# Patient Record
Sex: MALE | Race: WHITE | Employment: FULL TIME | URBAN - METROPOLITAN AREA
[De-identification: names, ages, dates, MRNs, and addresses within clinical notes are randomized per-mention and may not be internally consistent; named-entity substitution may affect disease eponyms.]

---

## 2017-10-30 PROBLEM — N52.9 ERECTILE DYSFUNCTION, UNSPECIFIED ERECTILE DYSFUNCTION TYPE: Status: ACTIVE | Noted: 2017-10-30

## 2017-10-30 PROBLEM — E29.1 HYPOGONADISM IN MALE: Status: ACTIVE | Noted: 2017-10-30

## 2017-11-30 ENCOUNTER — OFFICE VISIT (OUTPATIENT)
Dept: OTHER | Facility: HOSPITAL | Age: 57
End: 2017-11-30
Attending: PREVENTIVE MEDICINE

## 2017-11-30 DIAGNOSIS — Z00.00 ANNUAL PHYSICAL EXAM: Primary | ICD-10-CM

## 2017-12-18 PROCEDURE — 36415 COLL VENOUS BLD VENIPUNCTURE: CPT | Performed by: UROLOGY

## 2017-12-18 PROCEDURE — 84154 ASSAY OF PSA FREE: CPT | Performed by: UROLOGY

## 2017-12-18 PROCEDURE — 84153 ASSAY OF PSA TOTAL: CPT | Performed by: UROLOGY

## 2018-01-10 ENCOUNTER — SURGERY (OUTPATIENT)
Age: 58
End: 2018-01-10

## 2018-01-10 ENCOUNTER — HOSPITAL ENCOUNTER (OUTPATIENT)
Facility: HOSPITAL | Age: 58
Setting detail: HOSPITAL OUTPATIENT SURGERY
Discharge: HOME | End: 2018-01-10
Attending: INTERNAL MEDICINE | Admitting: INTERNAL MEDICINE
Payer: COMMERCIAL

## 2018-01-10 VITALS
TEMPERATURE: 98 F | BODY MASS INDEX: 29.82 KG/M2 | WEIGHT: 190 LBS | DIASTOLIC BLOOD PRESSURE: 84 MMHG | HEIGHT: 67 IN | OXYGEN SATURATION: 99 % | HEART RATE: 72 BPM | RESPIRATION RATE: 16 BRPM | SYSTOLIC BLOOD PRESSURE: 127 MMHG

## 2018-01-10 DIAGNOSIS — K21.9 GASTROESOPHAGEAL REFLUX DISEASE, ESOPHAGITIS PRESENCE NOT SPECIFIED: ICD-10-CM

## 2018-01-10 DIAGNOSIS — Z12.11 SPECIAL SCREENING FOR MALIGNANT NEOPLASMS, COLON: ICD-10-CM

## 2018-01-10 DIAGNOSIS — R10.13 ABDOMINAL PAIN, EPIGASTRIC: ICD-10-CM

## 2018-01-10 PROCEDURE — 0DBH8ZX EXCISION OF CECUM, VIA NATURAL OR ARTIFICIAL OPENING ENDOSCOPIC, DIAGNOSTIC: ICD-10-PCS | Performed by: INTERNAL MEDICINE

## 2018-01-10 PROCEDURE — 99153 MOD SED SAME PHYS/QHP EA: CPT | Performed by: INTERNAL MEDICINE

## 2018-01-10 PROCEDURE — 88305 TISSUE EXAM BY PATHOLOGIST: CPT | Performed by: INTERNAL MEDICINE

## 2018-01-10 PROCEDURE — 0DB68ZX EXCISION OF STOMACH, VIA NATURAL OR ARTIFICIAL OPENING ENDOSCOPIC, DIAGNOSTIC: ICD-10-PCS | Performed by: INTERNAL MEDICINE

## 2018-01-10 PROCEDURE — 99152 MOD SED SAME PHYS/QHP 5/>YRS: CPT | Performed by: INTERNAL MEDICINE

## 2018-01-10 RX ORDER — SODIUM CHLORIDE, SODIUM LACTATE, POTASSIUM CHLORIDE, CALCIUM CHLORIDE 600; 310; 30; 20 MG/100ML; MG/100ML; MG/100ML; MG/100ML
INJECTION, SOLUTION INTRAVENOUS CONTINUOUS
Status: DISCONTINUED | OUTPATIENT
Start: 2018-01-10 | End: 2018-01-10

## 2018-01-10 RX ORDER — ONDANSETRON 2 MG/ML
4 INJECTION INTRAMUSCULAR; INTRAVENOUS ONCE AS NEEDED
Status: DISCONTINUED | OUTPATIENT
Start: 2018-01-10 | End: 2018-01-10

## 2018-01-10 RX ORDER — MIDAZOLAM HYDROCHLORIDE 1 MG/ML
INJECTION INTRAMUSCULAR; INTRAVENOUS
Status: DISCONTINUED | OUTPATIENT
Start: 2018-01-10 | End: 2018-01-10

## 2018-01-10 NOTE — H&P
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   GERD, epigastric abd pain, CRC screening     See note reviewed from 1/5/18    PROCEDURE: EGD colonoscopy    Allergies: Patient has n

## 2018-01-10 NOTE — OPERATIVE REPORT
[address] Patient Status:  Hospital Outpatient Surgery    8/15/1960 MRN [id number]   [facility] ENDOSCOPY Attending Jean-Claude Paz MD   Hosp Day # 0 PCP Estefany Hernandez MD         PATIENT NAME: Tip Blank  DATE OF OP retrieved. There were a few diverticula noted in the sigmoid colon. The remainder of the entire examined colon was otherwise normal.  After retroflexion in the rectum, the colonoscope was straightened and removed and the procedure was completed.  The stacey

## 2018-01-11 NOTE — PROGRESS NOTES
Here are the biopsy/pathology findings from your recent EGD (upper endoscopy) and colonoscopy:     The biopsy/pathology findings from your upper endoscopy showed:    No infection in stomach    The  biopsy/pathology findings from your colonoscopy showed:

## 2018-01-24 NOTE — PAYOR COMM NOTE
--------------  ADMISSION REVIEW     Payor: [employer]  Subscriber #:  [id number]  Authorization Number: [id number]    Admit date: N/A  Admit time: [address] [facility] OP surgery - Not admitted       Admitting Physician: Sagrario Carl MD  Attendin mucosa was further carefully inspected. There was a 8 mm polyp in the cecum that was completely removed with a cold forceps and retrieved. There were a few diverticula noted in the sigmoid colon.   The remainder of the entire examined colon was otherwise

## 2018-03-07 PROCEDURE — 87177 OVA AND PARASITES SMEARS: CPT | Performed by: INTERNAL MEDICINE

## 2018-03-07 PROCEDURE — 87046 STOOL CULTR AEROBIC BACT EA: CPT | Performed by: INTERNAL MEDICINE

## 2018-03-07 PROCEDURE — 89055 LEUKOCYTE ASSESSMENT FECAL: CPT | Performed by: INTERNAL MEDICINE

## 2018-03-07 PROCEDURE — 87209 SMEAR COMPLEX STAIN: CPT | Performed by: INTERNAL MEDICINE

## 2018-03-07 PROCEDURE — 87329 GIARDIA AG IA: CPT | Performed by: INTERNAL MEDICINE

## 2018-03-07 PROCEDURE — 87045 FECES CULTURE AEROBIC BACT: CPT | Performed by: INTERNAL MEDICINE

## 2018-03-07 PROCEDURE — 87272 CRYPTOSPORIDIUM AG IF: CPT | Performed by: INTERNAL MEDICINE

## 2019-03-07 PROBLEM — E66.09 CLASS 1 OBESITY DUE TO EXCESS CALORIES WITHOUT SERIOUS COMORBIDITY WITH BODY MASS INDEX (BMI) OF 30.0 TO 30.9 IN ADULT: Status: ACTIVE | Noted: 2019-03-07

## 2020-05-23 PROBLEM — N52.9 ERECTILE DYSFUNCTION, UNSPECIFIED ERECTILE DYSFUNCTION TYPE: Status: RESOLVED | Noted: 2017-10-30 | Resolved: 2020-05-23

## 2023-05-07 ENCOUNTER — LAB ENCOUNTER (OUTPATIENT)
Dept: LAB | Facility: HOSPITAL | Age: 63
End: 2023-05-07
Attending: UROLOGY
Payer: COMMERCIAL

## 2023-05-07 DIAGNOSIS — Z01.818 PREOP TESTING: ICD-10-CM

## 2023-05-07 LAB
ANTIBODY SCREEN: NEGATIVE
RH BLOOD TYPE: POSITIVE
RH BLOOD TYPE: POSITIVE

## 2023-05-07 PROCEDURE — 86901 BLOOD TYPING SEROLOGIC RH(D): CPT

## 2023-05-07 PROCEDURE — 36415 COLL VENOUS BLD VENIPUNCTURE: CPT

## 2023-05-07 PROCEDURE — 86850 RBC ANTIBODY SCREEN: CPT

## 2023-05-07 PROCEDURE — 86900 BLOOD TYPING SEROLOGIC ABO: CPT

## 2023-05-11 ENCOUNTER — ANESTHESIA EVENT (OUTPATIENT)
Dept: SURGERY | Facility: HOSPITAL | Age: 63
End: 2023-05-11
Payer: COMMERCIAL

## 2023-05-11 ENCOUNTER — ANESTHESIA (OUTPATIENT)
Dept: SURGERY | Facility: HOSPITAL | Age: 63
End: 2023-05-11
Payer: COMMERCIAL

## 2023-05-11 ENCOUNTER — HOSPITAL ENCOUNTER (OUTPATIENT)
Facility: HOSPITAL | Age: 63
Discharge: HOME | End: 2023-05-12
Attending: UROLOGY | Admitting: UROLOGY
Payer: COMMERCIAL

## 2023-05-11 DIAGNOSIS — Z01.818 PREOP TESTING: Primary | ICD-10-CM

## 2023-05-11 PROBLEM — C61 PROSTATE CANCER (HCC): Status: ACTIVE | Noted: 2023-05-11

## 2023-05-11 LAB
ANION GAP SERPL CALC-SCNC: 5 MMOL/L (ref 0–18)
BUN BLD-MCNC: 21 MG/DL (ref 7–18)
BUN/CREAT SERPL: 20.6 (ref 10–20)
CALCIUM BLD-MCNC: 8.2 MG/DL (ref 8.5–10.1)
CHLORIDE SERPL-SCNC: 106 MMOL/L (ref 98–112)
CO2 SERPL-SCNC: 27 MMOL/L (ref 21–32)
CREAT BLD-MCNC: 1.02 MG/DL
DEPRECATED RDW RBC AUTO: 39.5 FL (ref 35.1–46.3)
ERYTHROCYTE [DISTWIDTH] IN BLOOD BY AUTOMATED COUNT: 12.3 % (ref 11–15)
GFR SERPLBLD BASED ON 1.73 SQ M-ARVRAT: 82 ML/MIN/1.73M2 (ref 60–?)
GLUCOSE BLD-MCNC: 160 MG/DL (ref 70–99)
HCT VFR BLD AUTO: 44 %
HGB BLD-MCNC: 14.3 G/DL
MCH RBC QN AUTO: 28.3 PG (ref 26–34)
MCHC RBC AUTO-ENTMCNC: 32.5 G/DL (ref 31–37)
MCV RBC AUTO: 87 FL
OSMOLALITY SERPL CALC.SUM OF ELEC: 292 MOSM/KG (ref 275–295)
PLATELET # BLD AUTO: 188 10(3)UL (ref 150–450)
POTASSIUM SERPL-SCNC: 3.6 MMOL/L (ref 3.5–5.1)
RBC # BLD AUTO: 5.06 X10(6)UL
SODIUM SERPL-SCNC: 138 MMOL/L (ref 136–145)
WBC # BLD AUTO: 14.5 X10(3) UL (ref 4–11)

## 2023-05-11 PROCEDURE — 8E0W4CZ ROBOTIC ASSISTED PROCEDURE OF TRUNK REGION, PERCUTANEOUS ENDOSCOPIC APPROACH: ICD-10-PCS | Performed by: UROLOGY

## 2023-05-11 PROCEDURE — 88307 TISSUE EXAM BY PATHOLOGIST: CPT | Performed by: UROLOGY

## 2023-05-11 PROCEDURE — 85027 COMPLETE CBC AUTOMATED: CPT | Performed by: UROLOGY

## 2023-05-11 PROCEDURE — 80048 BASIC METABOLIC PNL TOTAL CA: CPT | Performed by: UROLOGY

## 2023-05-11 PROCEDURE — 0VT04ZZ RESECTION OF PROSTATE, PERCUTANEOUS ENDOSCOPIC APPROACH: ICD-10-PCS | Performed by: UROLOGY

## 2023-05-11 PROCEDURE — 07BC4ZX EXCISION OF PELVIS LYMPHATIC, PERCUTANEOUS ENDOSCOPIC APPROACH, DIAGNOSTIC: ICD-10-PCS | Performed by: UROLOGY

## 2023-05-11 PROCEDURE — 88309 TISSUE EXAM BY PATHOLOGIST: CPT | Performed by: UROLOGY

## 2023-05-11 RX ORDER — MAGNESIUM HYDROXIDE 1200 MG/15ML
LIQUID ORAL CONTINUOUS PRN
Status: COMPLETED | OUTPATIENT
Start: 2023-05-11 | End: 2023-05-11

## 2023-05-11 RX ORDER — FAMOTIDINE 20 MG/1
20 TABLET, FILM COATED ORAL ONCE
Status: COMPLETED | OUTPATIENT
Start: 2023-05-11 | End: 2023-05-11

## 2023-05-11 RX ORDER — MORPHINE SULFATE 10 MG/ML
6 INJECTION, SOLUTION INTRAMUSCULAR; INTRAVENOUS EVERY 10 MIN PRN
Status: DISCONTINUED | OUTPATIENT
Start: 2023-05-11 | End: 2023-05-11 | Stop reason: HOSPADM

## 2023-05-11 RX ORDER — CEFAZOLIN SODIUM/WATER 2 G/20 ML
2 SYRINGE (ML) INTRAVENOUS EVERY 8 HOURS
Status: COMPLETED | OUTPATIENT
Start: 2023-05-11 | End: 2023-05-12

## 2023-05-11 RX ORDER — ONDANSETRON 2 MG/ML
4 INJECTION INTRAMUSCULAR; INTRAVENOUS EVERY 6 HOURS PRN
Status: DISCONTINUED | OUTPATIENT
Start: 2023-05-11 | End: 2023-05-12

## 2023-05-11 RX ORDER — MORPHINE SULFATE 4 MG/ML
4 INJECTION, SOLUTION INTRAMUSCULAR; INTRAVENOUS EVERY 10 MIN PRN
Status: DISCONTINUED | OUTPATIENT
Start: 2023-05-11 | End: 2023-05-11 | Stop reason: HOSPADM

## 2023-05-11 RX ORDER — ROCURONIUM BROMIDE 10 MG/ML
INJECTION, SOLUTION INTRAVENOUS AS NEEDED
Status: DISCONTINUED | OUTPATIENT
Start: 2023-05-11 | End: 2023-05-11 | Stop reason: SURG

## 2023-05-11 RX ORDER — BUPIVACAINE HYDROCHLORIDE 2.5 MG/ML
INJECTION, SOLUTION EPIDURAL; INFILTRATION; INTRACAUDAL AS NEEDED
Status: DISCONTINUED | OUTPATIENT
Start: 2023-05-11 | End: 2023-05-11 | Stop reason: HOSPADM

## 2023-05-11 RX ORDER — HYDROMORPHONE HYDROCHLORIDE 1 MG/ML
0.2 INJECTION, SOLUTION INTRAMUSCULAR; INTRAVENOUS; SUBCUTANEOUS EVERY 5 MIN PRN
Status: DISCONTINUED | OUTPATIENT
Start: 2023-05-11 | End: 2023-05-11 | Stop reason: HOSPADM

## 2023-05-11 RX ORDER — HYDROMORPHONE HYDROCHLORIDE 1 MG/ML
0.4 INJECTION, SOLUTION INTRAMUSCULAR; INTRAVENOUS; SUBCUTANEOUS EVERY 5 MIN PRN
Status: DISCONTINUED | OUTPATIENT
Start: 2023-05-11 | End: 2023-05-11 | Stop reason: HOSPADM

## 2023-05-11 RX ORDER — PROCHLORPERAZINE EDISYLATE 5 MG/ML
5 INJECTION INTRAMUSCULAR; INTRAVENOUS EVERY 8 HOURS PRN
Status: DISCONTINUED | OUTPATIENT
Start: 2023-05-11 | End: 2023-05-12

## 2023-05-11 RX ORDER — HYDROMORPHONE HYDROCHLORIDE 1 MG/ML
0.6 INJECTION, SOLUTION INTRAMUSCULAR; INTRAVENOUS; SUBCUTANEOUS EVERY 5 MIN PRN
Status: DISCONTINUED | OUTPATIENT
Start: 2023-05-11 | End: 2023-05-11 | Stop reason: HOSPADM

## 2023-05-11 RX ORDER — MEPERIDINE HYDROCHLORIDE 25 MG/ML
25 INJECTION INTRAMUSCULAR; INTRAVENOUS; SUBCUTANEOUS ONCE
Status: COMPLETED | OUTPATIENT
Start: 2023-05-11 | End: 2023-05-11

## 2023-05-11 RX ORDER — ACETAMINOPHEN 500 MG
1000 TABLET ORAL ONCE
Status: COMPLETED | OUTPATIENT
Start: 2023-05-11 | End: 2023-05-11

## 2023-05-11 RX ORDER — DEXAMETHASONE SODIUM PHOSPHATE 4 MG/ML
VIAL (ML) INJECTION AS NEEDED
Status: DISCONTINUED | OUTPATIENT
Start: 2023-05-11 | End: 2023-05-11 | Stop reason: SURG

## 2023-05-11 RX ORDER — PROCHLORPERAZINE EDISYLATE 5 MG/ML
5 INJECTION INTRAMUSCULAR; INTRAVENOUS EVERY 8 HOURS PRN
Status: DISCONTINUED | OUTPATIENT
Start: 2023-05-11 | End: 2023-05-11 | Stop reason: HOSPADM

## 2023-05-11 RX ORDER — DOCUSATE SODIUM 100 MG/1
100 CAPSULE, LIQUID FILLED ORAL 2 TIMES DAILY
Status: DISCONTINUED | OUTPATIENT
Start: 2023-05-11 | End: 2023-05-12

## 2023-05-11 RX ORDER — ONDANSETRON 2 MG/ML
4 INJECTION INTRAMUSCULAR; INTRAVENOUS EVERY 6 HOURS PRN
Status: DISCONTINUED | OUTPATIENT
Start: 2023-05-11 | End: 2023-05-11 | Stop reason: HOSPADM

## 2023-05-11 RX ORDER — ACETAMINOPHEN 500 MG
1000 TABLET ORAL EVERY 8 HOURS SCHEDULED
Status: DISCONTINUED | OUTPATIENT
Start: 2023-05-11 | End: 2023-05-12

## 2023-05-11 RX ORDER — METOCLOPRAMIDE 10 MG/1
10 TABLET ORAL ONCE
Status: COMPLETED | OUTPATIENT
Start: 2023-05-11 | End: 2023-05-11

## 2023-05-11 RX ORDER — TRAMADOL HYDROCHLORIDE 50 MG/1
50 TABLET ORAL EVERY 6 HOURS PRN
Status: DISCONTINUED | OUTPATIENT
Start: 2023-05-11 | End: 2023-05-12

## 2023-05-11 RX ORDER — CEFAZOLIN SODIUM/WATER 2 G/20 ML
2 SYRINGE (ML) INTRAVENOUS ONCE
Status: COMPLETED | OUTPATIENT
Start: 2023-05-11 | End: 2023-05-11

## 2023-05-11 RX ORDER — SODIUM CHLORIDE, SODIUM LACTATE, POTASSIUM CHLORIDE, CALCIUM CHLORIDE 600; 310; 30; 20 MG/100ML; MG/100ML; MG/100ML; MG/100ML
INJECTION, SOLUTION INTRAVENOUS CONTINUOUS
Status: DISCONTINUED | OUTPATIENT
Start: 2023-05-11 | End: 2023-05-11

## 2023-05-11 RX ORDER — MORPHINE SULFATE 4 MG/ML
2 INJECTION, SOLUTION INTRAMUSCULAR; INTRAVENOUS EVERY 10 MIN PRN
Status: DISCONTINUED | OUTPATIENT
Start: 2023-05-11 | End: 2023-05-11 | Stop reason: HOSPADM

## 2023-05-11 RX ORDER — EPHEDRINE SULFATE 50 MG/ML
INJECTION, SOLUTION INTRAVENOUS AS NEEDED
Status: DISCONTINUED | OUTPATIENT
Start: 2023-05-11 | End: 2023-05-11 | Stop reason: SURG

## 2023-05-11 RX ORDER — SODIUM CHLORIDE, SODIUM LACTATE, POTASSIUM CHLORIDE, CALCIUM CHLORIDE 600; 310; 30; 20 MG/100ML; MG/100ML; MG/100ML; MG/100ML
INJECTION, SOLUTION INTRAVENOUS CONTINUOUS
Status: DISCONTINUED | OUTPATIENT
Start: 2023-05-11 | End: 2023-05-11 | Stop reason: HOSPADM

## 2023-05-11 RX ORDER — SODIUM CHLORIDE 9 MG/ML
INJECTION, SOLUTION INTRAVENOUS CONTINUOUS
Status: DISCONTINUED | OUTPATIENT
Start: 2023-05-11 | End: 2023-05-12

## 2023-05-11 RX ORDER — HEPARIN SODIUM 5000 [USP'U]/ML
5000 INJECTION, SOLUTION INTRAVENOUS; SUBCUTANEOUS ONCE
Status: COMPLETED | OUTPATIENT
Start: 2023-05-11 | End: 2023-05-11

## 2023-05-11 RX ORDER — HEPARIN SODIUM 5000 [USP'U]/ML
5000 INJECTION, SOLUTION INTRAVENOUS; SUBCUTANEOUS EVERY 8 HOURS SCHEDULED
Status: DISCONTINUED | OUTPATIENT
Start: 2023-05-11 | End: 2023-05-12

## 2023-05-11 RX ORDER — POLYETHYLENE GLYCOL 3350 17 G/17G
17 POWDER, FOR SOLUTION ORAL DAILY PRN
Status: DISCONTINUED | OUTPATIENT
Start: 2023-05-11 | End: 2023-05-12

## 2023-05-11 RX ORDER — NALOXONE HYDROCHLORIDE 0.4 MG/ML
80 INJECTION, SOLUTION INTRAMUSCULAR; INTRAVENOUS; SUBCUTANEOUS AS NEEDED
Status: DISCONTINUED | OUTPATIENT
Start: 2023-05-11 | End: 2023-05-11 | Stop reason: HOSPADM

## 2023-05-11 RX ORDER — SENNOSIDES 8.6 MG
17.2 TABLET ORAL NIGHTLY
Status: DISCONTINUED | OUTPATIENT
Start: 2023-05-11 | End: 2023-05-12

## 2023-05-11 RX ORDER — ONDANSETRON 2 MG/ML
INJECTION INTRAMUSCULAR; INTRAVENOUS AS NEEDED
Status: DISCONTINUED | OUTPATIENT
Start: 2023-05-11 | End: 2023-05-11 | Stop reason: SURG

## 2023-05-11 RX ADMIN — SODIUM CHLORIDE, SODIUM LACTATE, POTASSIUM CHLORIDE, CALCIUM CHLORIDE: 600; 310; 30; 20 INJECTION, SOLUTION INTRAVENOUS at 12:42:00

## 2023-05-11 RX ADMIN — ONDANSETRON 4 MG: 2 INJECTION INTRAMUSCULAR; INTRAVENOUS at 12:47:00

## 2023-05-11 RX ADMIN — EPHEDRINE SULFATE 10 MG: 50 INJECTION, SOLUTION INTRAVENOUS at 08:47:00

## 2023-05-11 RX ADMIN — SODIUM CHLORIDE, SODIUM LACTATE, POTASSIUM CHLORIDE, CALCIUM CHLORIDE: 600; 310; 30; 20 INJECTION, SOLUTION INTRAVENOUS at 12:46:00

## 2023-05-11 RX ADMIN — ROCURONIUM BROMIDE 50 MG: 10 INJECTION, SOLUTION INTRAVENOUS at 08:48:00

## 2023-05-11 RX ADMIN — ROCURONIUM BROMIDE 30 MG: 10 INJECTION, SOLUTION INTRAVENOUS at 11:17:00

## 2023-05-11 RX ADMIN — CEFAZOLIN SODIUM/WATER 2 G: 2 G/20 ML SYRINGE (ML) INTRAVENOUS at 08:42:00

## 2023-05-11 RX ADMIN — DEXAMETHASONE SODIUM PHOSPHATE 4 MG: 4 MG/ML VIAL (ML) INJECTION at 12:47:00

## 2023-05-11 RX ADMIN — ROCURONIUM BROMIDE 30 MG: 10 INJECTION, SOLUTION INTRAVENOUS at 09:59:00

## 2023-05-11 NOTE — OPERATIVE REPORT
Operative Report  [facility]    Patient name: Prabhjot Ramirez Room 8/15/1960  MRN [id number]    Date of operation 05/11/23    Preoperative Diagnosis:  1. Prostate cancer    Postoperative Diagnosis:   Same    Procedure:   1. Robotic-assisted laparoscopic radical prostatectomy  2. Bilateral pelvic lymphadenectomy    Surgeon:  Brandon Marr MD    Assistant:  Bernarda Falcon CSA    Findings:   A standard template dissection was performed with removal of the obturator, hypogastric and external iliac lymph nodes. A (bilateral nerve sparing) procedure was performed. A running urethrovesical anastomosis was performed which was noted to be watertight at the end of the procedure. There were no intraoperative complications. Anesthesia: general    Complications: none    EBL: 100cc    Drains:  18Fr lopez catheter    Condition: transferred to PACU in stable condition    Specimen:   Bilateral pelvic lymph nodes  Prostate and bilateral seminal vesicles, periprostatic fat    Operative indication:  62M with unfavorable intermediate risk prostate cancer. His PSA was 9.06 ng/ml (1/27/23). MRI prostate 2/4/23: volume 21.1g; PIRADS IV lesion in right mid PZ, 7mm. He underwent TRUS-guided PNBx 2/14/23: GG2 (Gl3+4) in six cores (R mid, R apex; 38mm), GG1 (Gl3+3) in two cores (R base, 9mm). He presents today for robotic-assisted laparoscopic radical prostatectomy, bilateral pelvic lymph node dissection. OPERATIVE PROCEDURE:    After informed consent was obtained, the patient was taken to the operating room. A time-out was performed where the patient and procedure were identified in the presence of Nursing, Anesthesia, and surgical staff. After the placement of lines and monitors, general anesthesia was induced. Prophylactic antibiotics were administered. Sequential compressive devices were placed on both lower extremities. The patient was placed in the lithotomy position.   He was prepped and draped in a standard sterile fashion. Through a supra-umbilical incision, a Veress needle was inserted into the peritoneal cavity and a pneumoperitoneum was established. Due to high insufflation pressure, a stab incision was made at Jung's point. The veress needle was then introduced and pneumoperitoneum was again established. The incisions were marked out. Through the left lower quadrant incision, a 8-mm trocar was placed and the laparoscope was introduced. Inspection of the abdominal cavity after trocar placement revealed no evidence of any injury. Three 8-mm ports and a 12-mm port were then placed in the abdomen in the standard configuration under direct vision. The patient was then placed in steep Trendelenburg position and the robot was docked. He was noted to have adhesions in the right lower quadrant at the area of his prior appendectomy. These were taken down. Left sided colonic adhesions were taken down. We then retracted the colon into the abdomen and made a posterior peritoneal incision, dissected the seminal vesicles and vas deferens. The anterior space was made. We then performed bilateral pelvic lymph node dissection. The borders of our dissection were the external iliac vein down to the deep obturator fossa. Weck clips were placed proximally and distally. The obturator nerve, artery and vein were identified and preserved. We then mobilized the bladder off the anterior abdominal wall, entered the space of Retzius and  mobilized the periprostatic fat off the prostate. We opened up the endopelvic fascia in a blunt dissection manner from the base to apex. We then secured our deep dorsal vein using an 0 Vicryl suture in interrupted fashion. We then made an anterior cystotomy, dissected down to bladder circumferentially. The bladder neck was made small. We then retracted the seminal vesicles and the vas deferens anteriorly. The posterior pedicle of the prostate was taken down.  We then performed complete neurovascular bundle dissection on the right side and left side. From base to apex, this dissection was completed. We then completed our transection of the dorsal vein as well as transection of the anterior urethra. We then completed the posterior dissection of the urethra. The prostate was then placed in an EndoCatch bag. We irrigated the pelvis with water. Spot electrocautery was used to stop small venous bleeding. We then performed urethrovesical anastomosis using a running suture of 3-0 V-Lock. Upon completion of the anastomosis, an 18 Fr Limon catheter was advanced into the urinary bladder. Upon completion of the anastomosis, the bladder was instilled with 180 mL of saline and the anastomosis was noted to be watertight. The ports were then removed under direct vision and there was no evidence of bleeding from any of the trocar sites under low pneumoperitoneal pressure. The periumbilical fascial incision was then extended and the lymph node and prostate specimens were extracted in the EndoCatch bag. The fascial incision in the periumbilical location was then closed using a running 0 vicryl suture. The skin of all port sites was then reapproximated using a subcuticular stitch of 4-0 Monocryl. Dermabond was applied to all the incisions. The patient was then lightened from anesthesia and transferred to recovery room in stable condition having tolerated the procedure well without incident or complication. I was present throughout the entire procedure and performed all critical steps. Please note that Vinh Melton provided necessary first assistant services under my supervision throughout the case. DISPOSITION: To recovery room in stable condition.     Letha Church MD  [facility] [place] [facility]

## 2023-05-11 NOTE — ANESTHESIA PROCEDURE NOTES
Peripheral IV  Date/Time: 5/11/2023 8:54 AM  Inserted by: Willian Stevens MD    Placement  Needle size: 18 G  Laterality: right  Location: arm  Local anesthetic: injectable  Site prep: alcohol  Technique: anatomical landmarks  Attempts: 1

## 2023-05-11 NOTE — ANESTHESIA PROCEDURE NOTES
Airway  Date/Time: 5/11/2023 8:49 AM  Urgency: Elective    Airway not difficult    General Information and Staff    Patient location during procedure: OR  Anesthesiologist: Brielle Francisco MD  Performed: anesthesiologist   Performed by: Brielle Francisco MD  Authorized by: Brielle Francisco MD      Indications and Patient Condition  Indications for airway management: anesthesia  Sedation level: deep  Preoxygenated: yes  Patient position: sniffing  Mask difficulty assessment: 1 - vent by mask  No planned trial extubation    Final Airway Details  Final airway type: endotracheal airway      Successful airway: ETT  Cuffed: yes   Successful intubation technique: Video laryngoscopy  Endotracheal tube insertion site: oral  Blade: GlideScope    Placement verified by: chest auscultation and capnometry   Measured from: teeth  Number of attempts at approach: 1

## 2023-05-11 NOTE — ANESTHESIA POSTPROCEDURE EVALUATION
Patient: Tatianna Merrill    Procedure Summary     Date: 05/11/23 Room / Location: [address] [place] [address] MAIN OR 06 / [address] [place] [address] MAIN OR    Anesthesia Start: 3789 Anesthesia Stop:     Procedure: Robotic assisted laparoscopic prostatectomy with bilateral pelvic lymph node dissection (Abdomen) Diagnosis: (Prostate cancer)    Surgeons: Naomy Monaco MD Anesthesiologist: Manuel Astudillo MD    Anesthesia Type: general ASA Status: 2          Anesthesia Type: general    Vitals Value Taken Time   /78 05/11/23 1305   Temp 98 05/11/23 1305   Pulse 68 05/11/23 1304   Resp 16 05/11/23 1304   SpO2 99 05/11/23 1305   Vitals shown include unvalidated device data.     [address] [place] [address] AN Post Evaluation:   Patient Evaluated in PACU  Patient Participation: complete - patient participated  Level of Consciousness: awake  Pain Management: adequate  Airway Patency:patent  Dental exam unchanged from preop  Yes    Cardiovascular Status: acceptable  Respiratory Status: acceptable  Postoperative Hydration acceptable      Otto Sullivan MD  5/11/2023 1:05 PM

## 2023-05-12 VITALS
OXYGEN SATURATION: 94 % | BODY MASS INDEX: 30.06 KG/M2 | DIASTOLIC BLOOD PRESSURE: 65 MMHG | HEART RATE: 70 BPM | HEIGHT: 67 IN | TEMPERATURE: 98 F | RESPIRATION RATE: 16 BRPM | WEIGHT: 191.5 LBS | SYSTOLIC BLOOD PRESSURE: 121 MMHG

## 2023-05-12 LAB
ANION GAP SERPL CALC-SCNC: 5 MMOL/L (ref 0–18)
BASOPHILS # BLD AUTO: 0.01 X10(3) UL (ref 0–0.2)
BASOPHILS NFR BLD AUTO: 0.1 %
BUN BLD-MCNC: 18 MG/DL (ref 7–18)
BUN/CREAT SERPL: 21.4 (ref 10–20)
CALCIUM BLD-MCNC: 7.9 MG/DL (ref 8.5–10.1)
CHLORIDE SERPL-SCNC: 106 MMOL/L (ref 98–112)
CO2 SERPL-SCNC: 29 MMOL/L (ref 21–32)
CREAT BLD-MCNC: 0.84 MG/DL
DEPRECATED RDW RBC AUTO: 40.1 FL (ref 35.1–46.3)
EOSINOPHIL # BLD AUTO: 0.01 X10(3) UL (ref 0–0.7)
EOSINOPHIL NFR BLD AUTO: 0.1 %
ERYTHROCYTE [DISTWIDTH] IN BLOOD BY AUTOMATED COUNT: 12.7 % (ref 11–15)
GFR SERPLBLD BASED ON 1.73 SQ M-ARVRAT: 97 ML/MIN/1.73M2 (ref 60–?)
GLUCOSE BLD-MCNC: 109 MG/DL (ref 70–99)
HCT VFR BLD AUTO: 35.6 %
HGB BLD-MCNC: 11.9 G/DL
IMM GRANULOCYTES # BLD AUTO: 0.03 X10(3) UL (ref 0–1)
IMM GRANULOCYTES NFR BLD: 0.3 %
LYMPHOCYTES # BLD AUTO: 1.48 X10(3) UL (ref 1–4)
LYMPHOCYTES NFR BLD AUTO: 15.1 %
MCH RBC QN AUTO: 29 PG (ref 26–34)
MCHC RBC AUTO-ENTMCNC: 33.4 G/DL (ref 31–37)
MCV RBC AUTO: 86.6 FL
MONOCYTES # BLD AUTO: 1.07 X10(3) UL (ref 0.1–1)
MONOCYTES NFR BLD AUTO: 10.9 %
NEUTROPHILS # BLD AUTO: 7.23 X10 (3) UL (ref 1.5–7.7)
NEUTROPHILS # BLD AUTO: 7.23 X10(3) UL (ref 1.5–7.7)
NEUTROPHILS NFR BLD AUTO: 73.5 %
OSMOLALITY SERPL CALC.SUM OF ELEC: 292 MOSM/KG (ref 275–295)
PLATELET # BLD AUTO: 171 10(3)UL (ref 150–450)
POTASSIUM SERPL-SCNC: 3.3 MMOL/L (ref 3.5–5.1)
RBC # BLD AUTO: 4.11 X10(6)UL
SODIUM SERPL-SCNC: 140 MMOL/L (ref 136–145)
WBC # BLD AUTO: 9.8 X10(3) UL (ref 4–11)

## 2023-05-12 PROCEDURE — 80048 BASIC METABOLIC PNL TOTAL CA: CPT | Performed by: UROLOGY

## 2023-05-12 PROCEDURE — 85025 COMPLETE CBC W/AUTO DIFF WBC: CPT | Performed by: UROLOGY

## 2023-05-12 NOTE — DISCHARGE INSTRUCTIONS
[facility] GENERAL UROLOGY POST OPERATIVE INSTRUCTIONS    DIET:  Unless otherwise directed, resume your regular healthy diet. Return to any medically-directed diets if necessary (renal diet, diabetic diet, etc.). Drink plenty of fluids. Most fluids are all right, including small amounts of alcoholic beverages if desired (but not recommended if you are taking prescription pain medication or other medications that you may not use with alcohol ingestion.)    ACTIVITY:  Avoid strenuous physical exercise, including heavy lifting/pushing/pulling (>10-15 lbs.), for approximately 6 weeks. Driving is generally okay once pain free and off all prescription pain medications; of course, you may be a passenger for short rides. Extended travel is not recommended until you are released by your surgeon. It is okay to go up and down stairs as long as you go slowly. If you have any surgical clips or sutures, you may be allowed to take routine showers, but should not submerge your incisions in standing water (pool, tub, etc.). Avoid rubbing or scrubbing the incision(s). Rather, allow soapy water to pass over the incisions and simply pat them dry. VOIDING:  Many urology patients will be discharged with a catheter and will need additional instructions (see below); however, for patients without a catheter, please void whenever the urge presents itself. Do not hold your urine. You may be getting up in the middle of the night or urinating more frequently during the daytime after any urologic procedure. This is normal after many types of surgeries, but a more normal urinary voiding pattern should resume within days, or rarely within a few weeks. If you are unable to urinate altogether, please phone our nurse for further instructions, or seek attention in an immediate/urgent/or emergent setting. BLOOD IN THE URINE:  You may have some blood in the urine for two to four weeks after some urologic procedures.   This is usually not serious and a normal part of healing from some urinary surgeries. Should you have persistent blood, large clots, or the inability to void due to large clots, notify you urology team.    REST:  You should get plenty of rest after any procedure. However, use your bed as you did prior to your surgery - to take a small nap, and to sleep in the evenings. Do not lie around in bed all day as this can actually result in post anesthesia complications. We encourage you to get out of bed daily, take multiple light walks, strolling outdoors if desired. It is well known that patients that lie or sit all day have more wound complications, at times sever complications from blood clots in the legs, pneumonias, and other challenges. CONSTIPATION:  Please work to avoid constipation. We do not recommend enemas or most rectal suppositories after most urologic surgery. Daily use of Colace or Docusate over-the-counter (100 mg three times daily with 8 oz water) is recommended for the month after surgery - especially if taking prescription pain medication. Daily prune juice and increased intake of fruits and vegetables are also helpful to prevent acute constipation. Acute constipation may necessitate the use of over-the-counter Milk of Magnesia - 30 cc every evening until effect - if needed. MEDICATIONS:  Unless otherwise directed, resume all of your prior home medications upon discharge. The exception may be blood thinners (Coumadin, Warfarin, Plavix, Xeralto, etc.) which are typically held for one week prior to, and after, larger urologic surgeries. Your surgeon will give the recommended times for these medications to be held so that you may work with the nurse or physician tem that manages your anticoagulation medication. Should you be prescribed and antibiotic please take as directed until completed.     STENTS AND CATHETERS:  You may be discharged with a ureteral stent (hollow tube, placed in the conduit from your kidney to your bladder to allow unobstructed urine passage), or urethral catheter (tube placed into your urethra - into your bladder - to allow free passage of urine to a collection device. Each are vital to the healing of your normal urinary tract after some surgeries, lasting as short as a few days, to [rarely] months. With stents you may have some normal routine discomfort (it is a foreign body in your urinary tract), that can be similar to that of a kidney stone. At times, pressure from your bladder when you urinate can be transmitted up to the stent and cause pain. You will get used to this with time and many patients have found that if they urinate hourly (regardless of sensation to urinate) they will have more relief. It is also normal to have frequent urination with the stent, or a feeling of bladder fullness or spasms and at times difficulty with emptying your bladder. These symptoms also tend to get better with time, especially with plenty of fluids and use of your medications as directed. As previously noted, you may notice blood in the urine - especially with more activity. Until cleared by your surgeon to do so, avoid rigorous activities with either a stent or catheter to prevent worsening discomfort or blood in the urine. It is important that your stent be removed by a urologist - this is NOT a permanent medical device. It is also important that all stent and urinary catheter manipulations be coordinated with your urinary team.  Removing a stent is rather simple, usually done under local anesthesia a with a small cystoscope in the office. Some stents have a small string, which exits the urethra and may be visible in your pelvic area. DO NOT pull this string, as it will cause the stent to dislodge and may require surgery to replace.     INCENTIVE SPIROMETER/DEEP BREATHING EXERCISES:  You may be provided with an incentive spirometer (IS) breathing exercise machine while in the hospital.  Your nursing staff will educate you on it's use. This is a very important piece to post anesthesia pneumonia prevention, so take your IS home with you and continue regular use (10x/hour while awake) for the entire time you recover at home prior rot returning to work or regular activities. AFTER OPEN, LAPAROSCOPIC, OR ROBOTIC SURGERY:  Increase fluid intake to 2.5-3 liters per day, 50% in water. Catheter management per physician/urology team, if needed. Do not submerge incisions, may shower day after discharge. Multiple light walks daily but no strenuous activity  Regular, 10x/hour incentive spirometry use. Avoid constipation. No lifting > 10-15 pounds (nothing heavier than a gallon of milk)    FOLLOW UP CARE:  Please call our office at [phone] to schedule your follow up visit if you have not already done so. NOTIFY OUR OFFICE OF:  - Temperature greater than 100.4F. - Accitentally pulling the string and your stent is pulled out. - Any questions you think are important for your urology team.  - You have challenges with catheter management. - If you develop severe nausea/vomiting.  - Pus like fluid draining from any incision sites. SEEK EMERGENCY CARE with sudden onset of shortness of breath, chest pain, increasing calf or leg pain, or acute condition that you feel needs emergent attention.

## 2023-05-12 NOTE — DISCHARGE SUMMARY
General Medicine Discharge Summary     Patient ID:  Caity Ballard  57year old  8/15/1960    Admit date: 5/11/2023    Discharge date and time: 5/12/2023 11:13 AM     Attending Physician: Naty Silverio DO     Consults: None    Primary Care Physician: Dawood Pagan MD     Reason for admission: Elective prostatectomy    Risk For Readmission: Low    Discharge Diagnoses: Prostate cancer  Prostate cancer [facility])  See Additional Discharge Diagnoses in Hospital Course    Discharged Condition: good    Follow-up with labs/images appointments: Follow-up with urology in 2 weeks  Follow-up with primary care as needed 4 weeks    Exam  Gen: No acute distress  Pulm: Lungs clear, normal respiratory effort  CV: Heart with regular rate and rhythm  Abd: Abdomen soft,   EXT: no edema     HPI:   Patient is a 57year old male with PMH hypertension who presents for elective robotic assisted laparoscopic prostatectomy with bilateral lymph node dissection. Medical service was consulted for postoperative medical management. Patient was seen after surgery in the postanesthesia care unit he is endorsing some rigors and moderate pain at surgical site no fevers or chills no nausea or vomiting. Patient without chest pain headache or dizziness. Hospital Course:   Patient underwent admission for observation after radical prostatectomy Hospital course was noncomplicated discharged home the day after surgery no medications were adjusted    Operative Procedures: Procedure(s) (LRB):  Robotic assisted laparoscopic prostatectomy with bilateral pelvic lymph node dissection (N/A)     Imaging: No results found.     Disposition: home    Activity: activity as tolerated  Diet: regular diet  Wound Care: keep wound clean and dry  Code Status: Full Code  O2: none    Home Medication Changes: See list below    Med list     Medication List      CONTINUE taking these medications    losartan 100 MG Tabs  Commonly known as: Cozaar  Take 1 tablet (100 mg total) by mouth daily. FU   Follow-up Information     Samantha Sutton MD Follow up in 1 week(s). Specialty: UROLOGY  Why: lopez removal  Contact information:  [address]  [phone]             Leanne Galeas MD Follow up in 4 week(s). Specialties: Internal Medicine, ENDOCRINOLOGY  Why: As needed  Contact information:  [address] [facility] [address], [address] [phone]                         [place] instructions: Other Discharge Instructions:       [facility] UROLOGY POST OPERATIVE INSTRUCTIONS    DIET:  Unless otherwise directed, resume your regular healthy diet. Return to any medically-directed diets if necessary (renal diet, diabetic diet, etc.). Drink plenty of fluids. Most fluids are all right, including small amounts of alcoholic beverages if desired (but not recommended if you are taking prescription pain medication or other medications that you may not use with alcohol ingestion.)    ACTIVITY:  Avoid strenuous physical exercise, including heavy lifting/pushing/pulling (>10-15 lbs.), for approximately 6 weeks. Driving is generally okay once pain free and off all prescription pain medications; of course, you may be a passenger for short rides. Extended travel is not recommended until you are released by your surgeon. It is okay to go up and down stairs as long as you go slowly. If you have any surgical clips or sutures, you may be allowed to take routine showers, but should not submerge your incisions in standing water (pool, tub, etc.). Avoid rubbing or scrubbing the incision(s). Rather, allow soapy water to pass over the incisions and simply pat them dry. VOIDING:  Many urology patients will be discharged with a catheter and will need additional instructions (see below); however, for patients without a catheter, please void whenever the urge presents itself. Do not hold your urine.   You may be getting up in the middle of the night or urinating more frequently during the daytime after any urologic procedure. This is normal after many types of surgeries, but a more normal urinary voiding pattern should resume within days, or rarely within a few weeks. If you are unable to urinate altogether, please phone our nurse for further instructions, or seek attention in an immediate/urgent/or emergent setting. BLOOD IN THE URINE:  You may have some blood in the urine for two to four weeks after some urologic procedures. This is usually not serious and a normal part of healing from some urinary surgeries. Should you have persistent blood, large clots, or the inability to void due to large clots, notify you urology team.    REST:  You should get plenty of rest after any procedure. However, use your bed as you did prior to your surgery - to take a small nap, and to sleep in the evenings. Do not lie around in bed all day as this can actually result in post anesthesia complications. We encourage you to get out of bed daily, take multiple light walks, strolling outdoors if desired. It is well known that patients that lie or sit all day have more wound complications, at times sever complications from blood clots in the legs, pneumonias, and other challenges. CONSTIPATION:  Please work to avoid constipation. We do not recommend enemas or most rectal suppositories after most urologic surgery. Daily use of Colace or Docusate over-the-counter (100 mg three times daily with 8 oz water) is recommended for the month after surgery - especially if taking prescription pain medication. Daily prune juice and increased intake of fruits and vegetables are also helpful to prevent acute constipation. Acute constipation may necessitate the use of over-the-counter Milk of Magnesia - 30 cc every evening until effect - if needed. MEDICATIONS:  Unless otherwise directed, resume all of your prior home medications upon discharge.   The exception may be blood thinners (Coumadin, Warfarin, Plavix, Xeralto, etc.) which are typically held for one week prior to, and after, larger urologic surgeries. Your surgeon will give the recommended times for these medications to be held so that you may work with the nurse or physician tem that manages your anticoagulation medication. Should you be prescribed and antibiotic please take as directed until completed. STENTS AND CATHETERS:  You may be discharged with a ureteral stent (hollow tube, placed in the conduit from your kidney to your bladder to allow unobstructed urine passage), or urethral catheter (tube placed into your urethra - into your bladder - to allow free passage of urine to a collection device. Each are vital to the healing of your normal urinary tract after some surgeries, lasting as short as a few days, to [rarely] months. With stents you may have some normal routine discomfort (it is a foreign body in your urinary tract), that can be similar to that of a kidney stone. At times, pressure from your bladder when you urinate can be transmitted up to the stent and cause pain. You will get used to this with time and many patients have found that if they urinate hourly (regardless of sensation to urinate) they will have more relief. It is also normal to have frequent urination with the stent, or a feeling of bladder fullness or spasms and at times difficulty with emptying your bladder. These symptoms also tend to get better with time, especially with plenty of fluids and use of your medications as directed. As previously noted, you may notice blood in the urine - especially with more activity. Until cleared by your surgeon to do so, avoid rigorous activities with either a stent or catheter to prevent worsening discomfort or blood in the urine. It is important that your stent be removed by a urologist - this is NOT a permanent medical device.   It is also important that all stent and urinary catheter manipulations be coordinated with your urinary team.  Removing a stent is rather simple, usually done under local anesthesia a with a small cystoscope in the office. Some stents have a small string, which exits the urethra and may be visible in your pelvic area. DO NOT pull this string, as it will cause the stent to dislodge and may require surgery to replace. INCENTIVE SPIROMETER/DEEP BREATHING EXERCISES:  You may be provided with an incentive spirometer (IS) breathing exercise machine while in the hospital.  Your nursing staff will educate you on it's use. This is a very important piece to post anesthesia pneumonia prevention, so take your IS home with you and continue regular use (10x/hour while awake) for the entire time you recover at home prior rot returning to work or regular activities. AFTER OPEN, LAPAROSCOPIC, OR ROBOTIC SURGERY:  1. Increase fluid intake to 2.5-3 liters per day, 50% in water. 2. Catheter management per physician/urology team, if needed. 3. Do not submerge incisions, may shower day after discharge. 4. Multiple light walks daily but no strenuous activity  5. Regular, 10x/hour incentive spirometry use. 6. Avoid constipation. 7. No lifting > 10-15 pounds (nothing heavier than a gallon of milk)    FOLLOW UP CARE:  Please call our office at [phone] to schedule your follow up visit if you have not already done so. NOTIFY OUR OFFICE OF:  - Temperature greater than 100.4F. - Accitentally pulling the string and your stent is pulled out. - Any questions you think are important for your urology team.  - You have challenges with catheter management. - If you develop severe nausea/vomiting.  - Pus like fluid draining from any incision sites. SEEK EMERGENCY CARE with sudden onset of shortness of breath, chest pain, increasing calf or leg pain, or acute condition that you feel needs emergent attention.                     I reconciled current and discharge medications on day of discharge, discussed changes with patient and noted changes above.        Total Time Coordinating Care: Greater than 30 minutes    Patient had opportunity to ask questions and state understand and agree with therapeutic plan as outlined    Thank You,    Jorge L Galvan DO   Hospitalist with [facility] Care

## (undated) DEVICE — ROBOTIC: Brand: MEDLINE INDUSTRIES, INC.

## (undated) DEVICE — VIOLET BRAIDED (POLYGLACTIN 910), SYNTHETIC ABSORBABLE SUTURE: Brand: COATED VICRYL

## (undated) DEVICE — PROGRASP FORCEPS: Brand: ENDOWRIST

## (undated) DEVICE — Device: Brand: DEFENDO AIR/WATER/SUCTION AND BIOPSY VALVE

## (undated) DEVICE — SUT VICRYL 0 J608H

## (undated) DEVICE — GAMMEX® PI HYBRID SIZE 7, STERILE POWDER-FREE SURGICAL GLOVE, POLYISOPRENE AND NEOPRENE BLEND: Brand: GAMMEX

## (undated) DEVICE — LEGGINGS SRG 48X31IN CUF STRL

## (undated) DEVICE — SUT MONOCRYL 4-0 PS-2 Y496G

## (undated) DEVICE — BLADELESS OBTURATOR: Brand: WECK VISTA

## (undated) DEVICE — TRI-LUMEN FILTERED TUBE SET WITH ACTIVATED CHARCOAL FILTER: Brand: AIRSEAL

## (undated) DEVICE — ARM DRAPE

## (undated) DEVICE — AIRSEAL 12 MM ACCESS PORT AND PALM GRIP OBTURATOR WITH BLADELESS OPTICAL TIP, 120 MM LENGTH: Brand: AIRSEAL

## (undated) DEVICE — SUTURE VLOC 90 3-0 9" 0844

## (undated) DEVICE — CANNULA SEAL

## (undated) DEVICE — BIPOLAR GRASPER, LONG: Brand: ENDOWRIST

## (undated) DEVICE — TIP COVER ACCESSORY

## (undated) DEVICE — FORCEP RADIAL JAW 4

## (undated) DEVICE — SKIN PREP TRAY 4 COMPARTM TRAY: Brand: MEDLINE INDUSTRIES, INC.

## (undated) DEVICE — FILTERLINE NASAL ADULT O2/CO2

## (undated) DEVICE — SYSTEM SURGYPAD VELCRO 36IN

## (undated) DEVICE — ELECTRO LUBE IS A SINGLE PATIENT USE DEVICE THAT IS INTENDED TO BE USED ON ELECTROSURGICAL ELECTRODES TO REDUCE STICKING.: Brand: KEY SURGICAL ELECTRO LUBE

## (undated) DEVICE — INSUFFLATION NEEDLE TO ESTABLISH PNEUMOPERITONEUM.: Brand: INSUFFLATION NEEDLE

## (undated) DEVICE — DERMABOND CLOSURE 0.7ML TOPICL

## (undated) DEVICE — TRAY SURESTEP 16 BARDEX DRAIN

## (undated) DEVICE — PAD ALC 43.5X24IN PREP  LF

## (undated) DEVICE — LARGE NEEDLE DRIVER: Brand: ENDOWRIST

## (undated) DEVICE — CLIP HEMOLOK LARGE PURPLE

## (undated) DEVICE — COLUMN DRAPE

## (undated) DEVICE — ENDOSCOPY PACK - LOWER: Brand: MEDLINE INDUSTRIES, INC.

## (undated) DEVICE — TROCAR: Brand: KII FIOS FIRST ENTRY

## (undated) DEVICE — SYRINGE,TOOMEY,IRRIGATION,70CC,STERILE: Brand: MEDLINE

## (undated) DEVICE — SOLUTION  .9 3000ML

## (undated) DEVICE — MONOPOLAR CURVED SCISSORS: Brand: ENDOWRIST

## (undated) DEVICE — 3M™ RED DOT™ MONITORING ELECTRODE WITH FOAM TAPE AND STICKY GEL, 50/BAG, 20/CASE, 72/PLT 2570: Brand: RED DOT™

## (undated) DEVICE — GAMMEX® PI HYBRID SIZE 6.5, STERILE POWDER-FREE SURGICAL GLOVE, POLYISOPRENE AND NEOPRENE BLEND: Brand: GAMMEX

## (undated) DEVICE — CATH BARDEX IC FOLEY 18FR 5CC

## (undated) DEVICE — SUT VICRYL 0 CT-1 J340H

## (undated) DEVICE — SUCTION CANISTER, 3000CC,SAFELINER: Brand: DEROYAL

## (undated) DEVICE — 1200CC GUARDIAN II: Brand: GUARDIAN

## (undated) DEVICE — DRAPE SHEET LAPCHOLE 124X100X7

## (undated) DEVICE — SUT VICRYL 0 UR-6 J603H

## (undated) DEVICE — SOL NACL IRRIG 0.9% 1000ML BTL

## (undated) DEVICE — ANCHOR TISSUE RETRIEVAL SYSTEM, BAG SIZE 175 ML, PORT SIZE 10 MM: Brand: ANCHOR TISSUE RETRIEVAL SYSTEM

## (undated) DEVICE — STERILE SURGICAL LUBRICANT, METAL TUBE: Brand: SURGILUBE

## (undated) DEVICE — LAPAROVUE VISIBILITY SYSTEM LAPAROSCOPIC SOLUTIONS: Brand: LAPAROVUE

## (undated) DEVICE — ENDOSCOPY PACK UPPER: Brand: MEDLINE INDUSTRIES, INC.

## (undated) NOTE — LETTER
1/12/2018          Kimberley Bryant 30    Dear Jean Claude Sebastian,       Here are the biopsy/pathology findings from your recent EGD (upper endoscopy) and colonoscopy:     The biopsy/pathology findings from your upper endoscopy show